# Patient Record
Sex: MALE | RURAL
[De-identification: names, ages, dates, MRNs, and addresses within clinical notes are randomized per-mention and may not be internally consistent; named-entity substitution may affect disease eponyms.]

---

## 2020-07-31 ENCOUNTER — HISTORICAL (OUTPATIENT)
Dept: ADMINISTRATIVE | Facility: HOSPITAL | Age: 35
End: 2020-07-31

## 2020-07-31 LAB — SARS-COV+SARS-COV-2 AG RESP QL IA.RAPID: NEGATIVE

## 2020-08-11 ENCOUNTER — HISTORICAL (OUTPATIENT)
Dept: ADMINISTRATIVE | Facility: HOSPITAL | Age: 35
End: 2020-08-11

## 2020-08-11 LAB — SARS-COV+SARS-COV-2 AG RESP QL IA.RAPID: NEGATIVE

## 2020-09-04 ENCOUNTER — HISTORICAL (OUTPATIENT)
Dept: ADMINISTRATIVE | Facility: HOSPITAL | Age: 35
End: 2020-09-04

## 2020-09-04 LAB — SARS-COV+SARS-COV-2 AG RESP QL IA.RAPID: NEGATIVE

## 2020-11-13 ENCOUNTER — HISTORICAL (OUTPATIENT)
Dept: ADMINISTRATIVE | Facility: HOSPITAL | Age: 35
End: 2020-11-13

## 2020-11-13 LAB — SARS-COV+SARS-COV-2 AG RESP QL IA.RAPID: NEGATIVE

## 2021-02-22 ENCOUNTER — HISTORICAL (OUTPATIENT)
Dept: ADMINISTRATIVE | Facility: HOSPITAL | Age: 36
End: 2021-02-22

## 2021-02-22 LAB
FLUAV AG UPPER RESP QL IA.RAPID: NEGATIVE
FLUBV AG UPPER RESP QL IA.RAPID: NEGATIVE
SARS-COV+SARS-COV-2 AG RESP QL IA.RAPID: NEGATIVE

## 2024-09-13 ENCOUNTER — HOSPITAL ENCOUNTER (OUTPATIENT)
Dept: RADIOLOGY | Facility: HOSPITAL | Age: 39
Discharge: HOME | End: 2024-09-13
Payer: COMMERCIAL

## 2024-09-13 ENCOUNTER — OFFICE VISIT (OUTPATIENT)
Dept: GASTROENTEROLOGY | Facility: CLINIC | Age: 39
End: 2024-09-13
Payer: COMMERCIAL

## 2024-09-13 VITALS
BODY MASS INDEX: 23.53 KG/M2 | HEART RATE: 67 BPM | SYSTOLIC BLOOD PRESSURE: 107 MMHG | HEIGHT: 66 IN | DIASTOLIC BLOOD PRESSURE: 58 MMHG | WEIGHT: 146.38 LBS

## 2024-09-13 DIAGNOSIS — R11.0 NAUSEA: ICD-10-CM

## 2024-09-13 DIAGNOSIS — R10.31 RIGHT LOWER QUADRANT PAIN: Primary | ICD-10-CM

## 2024-09-13 DIAGNOSIS — R10.31 RIGHT LOWER QUADRANT PAIN: ICD-10-CM

## 2024-09-13 DIAGNOSIS — K57.92 DIVERTICULITIS: Primary | ICD-10-CM

## 2024-09-13 PROBLEM — K21.9 GASTRO-ESOPHAGEAL REFLUX DISEASE WITHOUT ESOPHAGITIS: Status: ACTIVE | Noted: 2024-09-13

## 2024-09-13 PROBLEM — E78.2 ELEVATED CHOLESTEROL WITH ELEVATED TRIGLYCERIDES: Status: ACTIVE | Noted: 2024-09-13

## 2024-09-13 PROBLEM — F90.9 ATTENTION DEFICIT HYPERACTIVITY DISORDER (ADHD): Status: ACTIVE | Noted: 2024-09-13

## 2024-09-13 PROBLEM — K58.2 IRRITABLE BOWEL SYNDROME WITH BOTH CONSTIPATION AND DIARRHEA: Status: ACTIVE | Noted: 2024-09-13

## 2024-09-13 PROCEDURE — 3074F SYST BP LT 130 MM HG: CPT | Mod: S$GLB,,,

## 2024-09-13 PROCEDURE — 1160F RVW MEDS BY RX/DR IN RCRD: CPT | Mod: S$GLB,,,

## 2024-09-13 PROCEDURE — 1159F MED LIST DOCD IN RCRD: CPT | Mod: S$GLB,,,

## 2024-09-13 PROCEDURE — 99999 PR PBB SHADOW E&M-NEW PATIENT-LVL IV: CPT | Mod: PBBFAC,,,

## 2024-09-13 PROCEDURE — 3008F BODY MASS INDEX DOCD: CPT | Mod: S$GLB,,,

## 2024-09-13 PROCEDURE — 74176 CT ABD & PELVIS W/O CONTRAST: CPT | Mod: TC

## 2024-09-13 PROCEDURE — 3078F DIAST BP <80 MM HG: CPT | Mod: S$GLB,,,

## 2024-09-13 PROCEDURE — 99204 OFFICE O/P NEW MOD 45 MIN: CPT | Mod: S$GLB,,,

## 2024-09-13 PROCEDURE — 74176 CT ABD & PELVIS W/O CONTRAST: CPT | Mod: 26,,, | Performed by: RADIOLOGY

## 2024-09-13 RX ORDER — CIPROFLOXACIN 500 MG/1
500 TABLET ORAL EVERY 12 HOURS
Qty: 28 TABLET | Refills: 0 | Status: SHIPPED | OUTPATIENT
Start: 2024-09-13 | End: 2024-09-27

## 2024-09-13 RX ORDER — METRONIDAZOLE 500 MG/1
500 TABLET ORAL EVERY 8 HOURS
Qty: 42 TABLET | Refills: 0 | Status: SHIPPED | OUTPATIENT
Start: 2024-09-13 | End: 2024-09-27

## 2024-09-13 RX ORDER — DEXTROAMPHETAMINE SACCHARATE, AMPHETAMINE ASPARTATE, DEXTROAMPHETAMINE SULFATE AND AMPHETAMINE SULFATE 5; 5; 5; 5 MG/1; MG/1; MG/1; MG/1
TABLET ORAL
COMMUNITY
Start: 2024-09-05

## 2024-09-13 RX ORDER — PROPRANOLOL HYDROCHLORIDE 20 MG/1
TABLET ORAL
COMMUNITY
Start: 2024-09-05

## 2024-09-13 RX ORDER — CEFDINIR 300 MG/1
300 CAPSULE ORAL 2 TIMES DAILY
COMMUNITY
Start: 2024-08-12

## 2024-09-13 RX ORDER — DEXAMETHASONE 4 MG/1
4 TABLET ORAL
COMMUNITY
Start: 2024-08-12

## 2024-09-13 NOTE — PROGRESS NOTES
Gastroenterology Clinic Note    Patient ID: [id number]   Referring MD: No ref. provider found   Chief Complaint:   Chief Complaint   Patient presents with    Abdominal Pain    Rectal Bleeding    Hemorrhoids       History of Present Illness   Javi Driscoll is an 38 y.o. WM who is referred for abdominal pain.  Patient complains of RLQ abdominal pain that radiates to his groin.  Onset of symptoms 3 days ago.  He denies fever/chills.  Has had decreased urine output and right flank/lower back pain.  Hx of diverticulitis and colon resection.  Small amount of BRBPR.  Hx of hemorrhoids.     Last colonoscopy was 2 years ago at outside facility.  Report unavailable for review.  Normal per patient report.     Review of Systems   Constitutional:  Negative for weight loss.   Gastrointestinal:  Positive for abdominal pain, blood in stool, constipation and nausea. Negative for diarrhea, heartburn, melena and vomiting.       Past Medical History    No past medical history on file.    Past Surgical History   No past surgical history on file.    Allergies   Review of patient's allergies indicates:  No Known Allergies    Immunization History     There is no immunization history on file for this patient.    Past Family History    No family history on file.    Past Social History      Social History     Socioeconomic History    Marital status:    Tobacco Use    Smoking status: Some Days     Types: Vaping with nicotine     Passive exposure: Current    Smokeless tobacco: Never       Current Medications     Outpatient Medications Marked as Taking for the 9/13/24 encounter (Office Visit) with Leti Chiu FNP   Medication Sig Dispense Refill    dextroamphetamine-amphetamine (ADDERALL) 20 mg tablet take 1/2 tab in the morning for 7 days then 1/2 twice daily for 7 days then 1 in the morning and 1/2 at noon for 7 days then 1 twice daily      propranoloL (INDERAL) 20 MG tablet 1/2 TO 1 tab daily as needed FOR anxiety. if heart rate is  "less THAN 60 beats PER minute DO not take this med          I have reviewed the current medications, allergies, vital signs, past medical and surgical history, family medical history, and social history for this encounter and agree with all findings.    OBJECTIVE    Physical Exam    BP (!) 107/58   Pulse 67   Ht 5' 6" (1.676 m)   Wt 66.4 kg (146 lb 6.4 oz)   BMI 23.63 kg/m²   GEN: Well appearing, cooperative, NAD  NECK: Supple, no LAD  CV: Normal rate  RESP: Unlabored  ABD: ND, NT, soft, no guarding  EXT: No clubbing, cyanosis, or edema  SKIN: Warm and dry  NEURO: AAO x4.     LABS    CBC (with or without Differential): No results found for: "WBC", "HGB", "HCT", "MCV", "MCH", "MCHC", "RDW", "PLT", "MPV", "NEUTROPCT", "NEUTOPHILPCT", "MONOPCT", "EOSPCT", "BASOPCT", "DIFFTYPE"  BMP/CMP: No results found for: "NA", "K", "CL", "CO2", "BUN", "CREATININE", "LABCREA", "GLU", "CALCIUM", "ALB", "ALBUMIN", "PHOSPHORUS", "AST", "ALT", "ALKPHOS", "MG"     IMAGING  No pertinent imaging available.    ASSESSMENT  Javi Driscoll is a 38 y.o. WM with history of GERD and IBS who is referred for abdominal pain.    1. Right lower quadrant pain    2. Nausea           PLAN    - Labs today, include UA  - CT Abdomen and pelvis w/o contrast to rule out diverticulitis vs renal stone; if unable to obtain CT, recommend ER evaluation given patient's complex medical history   Patient Instructions   - I recommend starting a daily fiber supplement with Citrucel or Fibercon (can purchase at your local pharmacy)  - I recommend that you also use Miralax 17 grams daily-to-twice daily as needed to have a regular, soft BM without straining you can adjust how often you need miralax, but start with daily dosing and go from there  - I recommend drinking at least 60-80 ounces of water daily unless you have a medical condition that requires fluid restriction        Orders Placed This Encounter   Procedures    CT Abdomen Pelvis  Without Contrast     " Standing Status:   Future     Standing Expiration Date:   9/13/2025     Order Specific Question:   Oral/Rectal Contrast instructions:     Answer:   NO Oral Contrast     Order Specific Question:   Special CT ABD Protocol Request?     Answer:   Routine     Order Specific Question:   May the Radiologist modify the order per protocol to meet the clinical needs of the patient?     Answer:   Yes    CBC Auto Differential     Standing Status:   Future     Standing Expiration Date:   11/12/2025    Comprehensive Metabolic Panel     Standing Status:   Future     Standing Expiration Date:   11/12/2025    Lipase     Standing Status:   Future     Standing Expiration Date:   11/12/2025    Urinalysis     Standing Status:   Future     Standing Expiration Date:   11/12/2025     Order Specific Question:   Collection Type     Answer:   Urine, Clean Catch         The risks and benefits of my recommendations, as well as other treatment options were discussed with the patient today. All questions were answered.    45 minutes of total time spent on the encounter, which includes face to face time and non-face to face time preparing to see the patient (eg, review of tests), obtaining and/or reviewing separately obtained history, documenting clinical information in the electronic or other health record, Independently interpreting results (not separately reported) and communicating results to the patient/family/caregiver, or care coordination (not separately reported).      Leti Chiu, ILIANAP/ACNP  [facility]

## 2024-09-16 ENCOUNTER — TELEPHONE (OUTPATIENT)
Dept: GASTROENTEROLOGY | Facility: CLINIC | Age: 39
End: 2024-09-16
Payer: COMMERCIAL

## 2024-09-16 NOTE — TELEPHONE ENCOUNTER
Spoke with patient and he is aware of results. Stated that he was feeling some better with the antibiotics. Will fax CT report to Dr. Derick Baldwin (PCP) to follow up on pulmonary nodule. Patient verbalized good understanding. Informed him that if he needed anything in the future to let us know.

## 2024-09-16 NOTE — TELEPHONE ENCOUNTER
----- Message from GUANACO Cartagena sent at 9/16/2024  8:32 AM CDT -----  His CT showed some wall thickening. I started antibiotics Friday. Check with him and see how he is feeling and make sure he follows-up with his GI. Also, notes a pulmonary nodule. If he sets up his mychart he can have this report for his PCP or we can fax it wherever he needs us to.

## 2024-10-29 ENCOUNTER — OFFICE VISIT (OUTPATIENT)
Dept: DERMATOLOGY | Facility: CLINIC | Age: 39
End: 2024-10-29
Payer: COMMERCIAL

## 2024-10-29 DIAGNOSIS — Z80.8 FAMILY HISTORY OF MELANOMA: Primary | ICD-10-CM

## 2024-10-29 DIAGNOSIS — L82.1 SK (SEBORRHEIC KERATOSIS): ICD-10-CM

## 2024-10-29 DIAGNOSIS — L73.8 SEBACEOUS HYPERPLASIA: ICD-10-CM

## 2025-05-20 ENCOUNTER — OFFICE VISIT (OUTPATIENT)
Dept: DERMATOLOGY | Facility: CLINIC | Age: 40
End: 2025-05-20
Payer: COMMERCIAL

## 2025-05-20 DIAGNOSIS — D22.9 MULTIPLE BENIGN NEVI: ICD-10-CM

## 2025-05-20 DIAGNOSIS — Z85.820 HISTORY OF MELANOMA: Primary | ICD-10-CM

## 2025-05-20 DIAGNOSIS — L72.0 EPIDERMAL CYST: ICD-10-CM

## 2025-05-20 RX ORDER — AZITHROMYCIN 250 MG/1
TABLET, FILM COATED ORAL
COMMUNITY
Start: 2025-04-02

## 2025-05-20 NOTE — PROGRESS NOTES
[facility]  Selwyn Salgado MD    [address] [place] [address]  [phone]    Fax: [phone]    Patient Name: Javi Driscoll  Medical Record Number: [id number]  PCP: Jason Baldwin MD  Age: 39 y.o. : 1985  Contact: There are no phone numbers on file.    History of Present Illness:     Javi Driscoll is a 39 y.o.  male here for follow up of family history of melanoma. Patient is concerned with lesions in the axilla that have grown and at time become inflamed.      The patient has no other concerns today.    Review of Systems:     Unremarkable other than mentioned above.     Physical Exam:     General: Relaxed, oriented, alert    Skin examination of the scalp, face, neck, chest, back, abdomen, upper extremities and lower extremities were normal except for as listed below      Assessment and Plan:     1. Family history of melanoma   - recommend annual skin exams   - discussed ABCDs of melanoma and recommend sunscreen 30 SPF or higher       2. Epidermal Cyst  - subcutaneous cyst with prominent follicular pore located on the R axilla    Plan:  - patient will call if decides to have excised. I do not favor this is furunculosis or hidradenitis at this time, but discussed this could change and evolve     3. Benign appearing melanocytic nevi   - no lesions appear clinically or dermatoscopically atypical enough to warrant biopsy at this time  - The patient was counseled on the ABCDE's of melanoma and on the importance of performing monthly self skin checks at home in between visits and will call our clinic with changes.  - discussed importance of sunscreen SPF 30 or greater     Selwyn Salgado MD   Mohs Surgery/Dermatologic Oncology  Dermatology